# Patient Record
Sex: FEMALE | Race: BLACK OR AFRICAN AMERICAN | NOT HISPANIC OR LATINO | Employment: FULL TIME | URBAN - METROPOLITAN AREA
[De-identification: names, ages, dates, MRNs, and addresses within clinical notes are randomized per-mention and may not be internally consistent; named-entity substitution may affect disease eponyms.]

---

## 2017-02-06 ENCOUNTER — TELEPHONE (OUTPATIENT)
Dept: OBSTETRICS AND GYNECOLOGY | Facility: CLINIC | Age: 37
End: 2017-02-06

## 2017-02-06 NOTE — TELEPHONE ENCOUNTER
Scheduled appt for pt to see Ida Fair at [facility] regarding right breast pain for 2/22/2017 at 7:30 AM. Pt communicated understanding.

## 2017-02-06 NOTE — TELEPHONE ENCOUNTER
----- Message from Mariano Marques sent at 2/6/2017 10:11 AM CST -----  Contact: pt  x_ 1st Request   _ 2nd Request   _ 3rd Request     Who: JUAN PABLO OLSON [[id number]]    Why: pt is experiencing right breast pain is requesting a call back to discuss options maybe mammogram    What Number to Call Back: [phone]    When to Expect a call back: (Before the end of the day)   -- if call after 3:00 call back will be tomorrow.

## 2017-02-22 ENCOUNTER — OFFICE VISIT (OUTPATIENT)
Dept: SURGERY | Facility: CLINIC | Age: 37
End: 2017-02-22
Payer: COMMERCIAL

## 2017-02-22 VITALS
HEART RATE: 75 BPM | TEMPERATURE: 98 F | BODY MASS INDEX: 37.88 KG/M2 | HEIGHT: 61 IN | WEIGHT: 200.63 LBS | DIASTOLIC BLOOD PRESSURE: 65 MMHG | SYSTOLIC BLOOD PRESSURE: 120 MMHG

## 2017-02-22 DIAGNOSIS — N64.4 BREAST PAIN: Primary | ICD-10-CM

## 2017-02-22 PROCEDURE — 1160F RVW MEDS BY RX/DR IN RCRD: CPT | Mod: S$GLB,,, | Performed by: NURSE PRACTITIONER

## 2017-02-22 PROCEDURE — 99999 PR PBB SHADOW E&M-EST. PATIENT-LVL III: CPT | Mod: PBBFAC,,, | Performed by: NURSE PRACTITIONER

## 2017-02-22 PROCEDURE — 99202 OFFICE O/P NEW SF 15 MIN: CPT | Mod: S$GLB,,, | Performed by: NURSE PRACTITIONER

## 2017-02-22 RX ORDER — DESOGESTREL AND ETHINYL ESTRADIOL 21-5 (28)
1 KIT ORAL DAILY
COMMUNITY
End: 2018-01-10 | Stop reason: SDUPTHER

## 2017-02-22 NOTE — PROGRESS NOTES
Subjective:      Patient ID: Melany Molina is a 36 y.o. female.    Chief Complaint: Consult (New Patient Right Breast Pain)      HPI: (PF, EPF - 1-3) (Detailed, Comp, - 4) new patient referred by Dr Oakley presents with c/o right breast pain, onset 2 weeks ago, reports sudden onset of burning pain right breast which lasted one day, spontaneously resolved. Denies breast mass, nipple discharge, skin changes, changes in her diet or medications. NO recalled breast trauma    Menarche at 10  , miscarriage  Early onset menopause at 25      Review of Systems  Objective:   Physical Exam   Pulmonary/Chest: She exhibits no mass, no tenderness, no edema, no deformity, no swelling and no retraction. Right breast exhibits no inverted nipple, no mass, no nipple discharge, no skin change and no tenderness. Left breast exhibits no inverted nipple, no mass, no nipple discharge, no skin change and no tenderness. Breasts are symmetrical. There is no breast swelling.   Lymphadenopathy:     She has no cervical adenopathy.     She has no axillary adenopathy.        Right: No supraclavicular adenopathy present.        Left: No supraclavicular adenopathy present.     Assessment:       1. Breast pain        Plan:       Clinically VIRIDIANA  Pain right breast for one day which spontaneously resolved, most likely cystic in origin. Reassurance provided, no further action recommended  Return prn any return of persistent breast pain, palpable mass, nipple discharge, skin changes

## 2017-02-22 NOTE — LETTER
February 22, 2017      Mary Oakley MD  [address]  [address]  [place] [facility] [place] [address]           [facility]  [address] [address]  Phone: [phone]          Patient: Melany Molina   MR Number: [id number]   YOB: 1980   Date of Visit: 2/22/2017       Dear Dr. Mary Oakley:    Thank you for referring Melany Molina to me for evaluation. Attached you will find relevant portions of my assessment and plan of care.    If you have questions, please do not hesitate to call me. I look forward to following Melany Molina along with you.    Sincerely,    Ida Fair, YOSELIN-CONTRERASP    Enclosure  CC:  No Recipients    If you would like to receive this communication electronically, please contact [email or web address] or [phone] to request more information on [employer] Link access.    For providers and/or their staff who would like to refer a patient to [facility], please contact us through our one-stop-shop provider referral line, [facility] , at [phone].    If you feel you have received this communication in error or would no longer like to receive these types of communications, please e-mail [email or web address]

## 2017-03-03 DIAGNOSIS — Z91.09 HISTORY OF AIRBORNE ALLERGIES: Primary | ICD-10-CM

## 2017-03-03 RX ORDER — AZELASTINE 1 MG/ML
SPRAY, METERED NASAL
Qty: 30 ML | Refills: 7 | Status: SHIPPED | OUTPATIENT
Start: 2017-03-03 | End: 2018-08-13 | Stop reason: SDUPTHER

## 2017-03-03 NOTE — TELEPHONE ENCOUNTER
----- Message from Sujey Little sent at 3/3/2017  9:58 AM CST -----  Contact: pt   X_  1st Request  _  2nd Request  _  3rd Request        Who: JUAN PABLO MORRISSEY [[id number]]X    Why: pt is requesting a refill in Rx azelastine (ASTELIN) 137 mcg (0.1 %) nasal spray     What Number to Call Back: [phone].    When to Expect a call back: (Before the end of the day)   -- if the call is after 12:00, the call back will be tomorrow.

## 2017-03-03 NOTE — TELEPHONE ENCOUNTER
Informed pt that a refill for her Astelin nasal spray has been approved by Dr. Oakley and sent to her pharmacy. Pt communicated understanding.

## 2017-03-03 NOTE — TELEPHONE ENCOUNTER
Pt requests refill for Astelin 137 mcg (0.1%) nasal spray. Pt says she uses the nasal spray daily and has run out of her last refill. PT says she typically receives refill from Dr. Oakley. Told pt I would pend and route orders to  to review and sign when she returns to the office on Monday.    Refill orders pended and routed to  to review and sign.

## 2017-04-13 RX ORDER — HYDROXYZINE PAMOATE 25 MG/1
25 CAPSULE ORAL EVERY 6 HOURS PRN
Qty: 30 CAPSULE | Refills: 0 | Status: SHIPPED | OUTPATIENT
Start: 2017-04-13 | End: 2018-08-13

## 2017-04-13 NOTE — TELEPHONE ENCOUNTER
Pt requests refill of Vistaril 25 mg capsules as she will be taking a flight this afternoon and says it helps with her anxiety.  Phoned in order and pended order to OSWALDO Koch NP to sign. Notified pt that Rx will be called in.

## 2017-04-13 NOTE — TELEPHONE ENCOUNTER
----- Message from Edi Brennan sent at 4/13/2017  1:54 PM CDT -----  Contact: Pt  X_ 1st Request  _ 2nd Request  _ 3rd Request    Who:JUAN PABLO MORRISSEY [[id number]]    Why: Patient states she would like to speak with staff in regards to getting a refill on hydrOXYzine pamoate (VISTARIL) 25 MG Cap called into [facility] [id number] [facility] [place] [address]. Patient is requesting meds before leaving out tonight at 7, please call with confirmation    What Number to Call Back: [phone]    When to Expect a call back: (Before the end of the day)  -- if call after 3:00 call back will be tomorrow.

## 2018-01-10 ENCOUNTER — TELEPHONE (OUTPATIENT)
Dept: OBSTETRICS AND GYNECOLOGY | Facility: CLINIC | Age: 38
End: 2018-01-10

## 2018-01-10 RX ORDER — DESOGESTREL AND ETHINYL ESTRADIOL AND ETHINYL ESTRADIOL 21-5 (28)
KIT ORAL
Qty: 28 TABLET | Refills: 0 | Status: SHIPPED | OUTPATIENT
Start: 2018-01-10 | End: 2018-02-02 | Stop reason: SDUPTHER

## 2018-01-10 NOTE — TELEPHONE ENCOUNTER
----- Message from Alejandra Morales sent at 1/10/2018 11:09 AM CST -----  Contact: JUAN PABLO MORRISSEY [[id number]]      x  1st Request  _  2nd Request  _  3rd Request    Please refill the medication(s) listed below. Please call the patient when the prescription(s) is ready for  at this phone number            Medication #1 desog-e.estradiol/e.estradiol (KARIVA) 0.15-0.02 mgx21 /0.01 mg x 5 per tablet (Discontinued) 28 tablet 11     Medication #2      Preferred Pharmacy:

## 2018-02-05 RX ORDER — DESOGESTREL AND ETHINYL ESTRADIOL AND ETHINYL ESTRADIOL 21-5 (28)
KIT ORAL
Qty: 28 TABLET | Refills: 0 | Status: SHIPPED | OUTPATIENT
Start: 2018-02-05 | End: 2018-04-30 | Stop reason: SDUPTHER

## 2018-04-05 DIAGNOSIS — Z91.09 HISTORY OF AIRBORNE ALLERGIES: ICD-10-CM

## 2018-04-05 RX ORDER — AZELASTINE 1 MG/ML
SPRAY, METERED NASAL
Qty: 30 ML | Refills: 0 | OUTPATIENT
Start: 2018-04-05

## 2018-04-30 RX ORDER — DESOGESTREL AND ETHINYL ESTRADIOL AND ETHINYL ESTRADIOL 21-5 (28)
KIT ORAL
Qty: 28 TABLET | Refills: 0 | Status: SHIPPED | OUTPATIENT
Start: 2018-04-30 | End: 2018-08-13 | Stop reason: SDUPTHER

## 2018-08-13 ENCOUNTER — OFFICE VISIT (OUTPATIENT)
Dept: OBSTETRICS AND GYNECOLOGY | Facility: CLINIC | Age: 38
End: 2018-08-13
Attending: OBSTETRICS & GYNECOLOGY
Payer: COMMERCIAL

## 2018-08-13 VITALS
DIASTOLIC BLOOD PRESSURE: 70 MMHG | WEIGHT: 212.06 LBS | HEIGHT: 61 IN | BODY MASS INDEX: 40.04 KG/M2 | SYSTOLIC BLOOD PRESSURE: 100 MMHG

## 2018-08-13 DIAGNOSIS — Z01.419 ENCOUNTER FOR GYNECOLOGICAL EXAMINATION WITHOUT ABNORMAL FINDING: Primary | ICD-10-CM

## 2018-08-13 DIAGNOSIS — F41.9 ANXIETY: ICD-10-CM

## 2018-08-13 DIAGNOSIS — Z91.09 HISTORY OF AIRBORNE ALLERGIES: ICD-10-CM

## 2018-08-13 DIAGNOSIS — Z12.31 SCREENING MAMMOGRAM, ENCOUNTER FOR: ICD-10-CM

## 2018-08-13 DIAGNOSIS — Z12.4 PAP SMEAR FOR CERVICAL CANCER SCREENING: ICD-10-CM

## 2018-08-13 DIAGNOSIS — B37.9 MONILIA INFECTION: ICD-10-CM

## 2018-08-13 DIAGNOSIS — E89.40 PREMATURE SURGICAL MENOPAUSE: ICD-10-CM

## 2018-08-13 PROCEDURE — 99999 PR PBB SHADOW E&M-EST. PATIENT-LVL III: CPT | Mod: PBBFAC,,, | Performed by: OBSTETRICS & GYNECOLOGY

## 2018-08-13 PROCEDURE — 88175 CYTOPATH C/V AUTO FLUID REDO: CPT

## 2018-08-13 PROCEDURE — 99396 PREV VISIT EST AGE 40-64: CPT | Mod: S$GLB,,, | Performed by: OBSTETRICS & GYNECOLOGY

## 2018-08-13 RX ORDER — AZELASTINE 1 MG/ML
SPRAY, METERED NASAL
Qty: 30 ML | Refills: 7 | Status: SHIPPED | OUTPATIENT
Start: 2018-08-13

## 2018-08-13 RX ORDER — FLUCONAZOLE 150 MG/1
150 TABLET ORAL ONCE
Qty: 1 TABLET | Refills: 0 | Status: SHIPPED | OUTPATIENT
Start: 2018-08-13 | End: 2018-08-13

## 2018-08-13 RX ORDER — FLUCONAZOLE 150 MG/1
150 TABLET ORAL ONCE
Qty: 1 TABLET | Refills: 0 | Status: SHIPPED | OUTPATIENT
Start: 2018-08-13 | End: 2018-08-13 | Stop reason: SDUPTHER

## 2018-08-13 RX ORDER — DESOGESTREL AND ETHINYL ESTRADIOL 21-5 (28)
1 KIT ORAL DAILY
Qty: 28 TABLET | Refills: 12 | Status: SHIPPED | OUTPATIENT
Start: 2018-08-13 | End: 2019-10-08 | Stop reason: SDUPTHER

## 2018-08-13 RX ORDER — ALPRAZOLAM 1 MG/1
1 TABLET ORAL NIGHTLY PRN
Qty: 2 TABLET | Refills: 0 | Status: SHIPPED | OUTPATIENT
Start: 2018-08-13 | End: 2019-08-13

## 2018-08-13 NOTE — PROGRESS NOTES
Subjective:       Patient ID: Melany Molina is a 38 y.o. female.    Chief Complaint:  Well Woman and Vaginal Discharge (itching)      History of Present Illness  HPI    Melany Molina is a 38 y.o. female  here for her annual GYN exam.  She also has a thick clumpy discharge with itching after using Neutrogena rainbath body wash.  She describes her periods as stopped with bilateral ovarian cystectomies for Dermoids in ,   denies break through bleeding.   denies vaginal itching or irritation.  Denies vaginal discharge.  She is not currently sexually active. She has had a recent breakup  She uses oral contraceptives (estrogen/progesterone) for contraception.   History of abnormal pap: No  Last Pap: approximate date  and was normal  Last MMG: None  Last Colonoscopy:  None  denies domestic violence. She does feel safe at home.     Past Medical History:   Diagnosis Date    Premature menopause      Past Surgical History:   Procedure Laterality Date    foot surgeries Bilateral     OVARIAN CYST REMOVAL Bilateral     Bilateral Dermoid cysts.     Social History     Socioeconomic History    Marital status:      Spouse name: Not on file    Number of children: Not on file    Years of education: Not on file    Highest education level: Not on file   Social Needs    Financial resource strain: Not on file    Food insecurity - worry: Not on file    Food insecurity - inability: Not on file    Transportation needs - medical: Not on file    Transportation needs - non-medical: Not on file   Occupational History    Not on file   Tobacco Use    Smoking status: Never Smoker    Smokeless tobacco: Never Used   Substance and Sexual Activity    Alcohol use: Yes     Alcohol/week: 0.6 oz     Types: 1 Glasses of wine per week    Drug use: No    Sexual activity: Not Currently     Partners: Male     Birth control/protection: OCP     Comment: , Current long term relationship just ended  "  Other Topics Concern    Not on file   Social History Narrative    Not on file     Family History   Problem Relation Age of Onset    Breast cancer Maternal Aunt 50    Breast cancer Paternal Aunt 50    Hypertension Mother     Colon cancer Neg Hx     Diabetes Neg Hx     Ovarian cancer Neg Hx     Stroke Neg Hx      OB History      Para Term  AB Living    2       2      SAB TAB Ectopic Multiple Live Births    1 1                /70   Ht 5' 1" (1.549 m)   Wt 96.2 kg (212 lb 1.3 oz)   LMP 2003   BMI 40.07 kg/m²         GYN & OB History  Patient's last menstrual period was 2003.   Date of Last Pap: 2015    OB History    Para Term  AB Living   2       2     SAB TAB Ectopic Multiple Live Births   1 1            # Outcome Date GA Lbr Corbin/2nd Weight Sex Delivery Anes PTL Lv   2 TAB 02           1 SAB 01                  Review of Systems  Review of Systems   Constitutional: Negative for activity change, appetite change, fatigue and unexpected weight change.   HENT: Negative.    Eyes: Negative for visual disturbance.   Respiratory: Negative for shortness of breath and wheezing.    Cardiovascular: Negative for chest pain, palpitations and leg swelling.   Gastrointestinal: Negative for abdominal pain, bloating and blood in stool.   Endocrine: Negative for diabetes and hair loss.   Genitourinary: Positive for vaginal discharge. Negative for decreased libido and dyspareunia.   Musculoskeletal: Negative for back pain and joint swelling.   Skin:  Negative for no acne and hair changes.   Neurological: Negative for headaches.   Hematological: Does not bruise/bleed easily.   Psychiatric/Behavioral: Negative for depression and sleep disturbance. The patient is not nervous/anxious.    Breast: Negative for breast pain and nipple discharge          Objective:      Physical Exam:   Constitutional: She is oriented to person, place, and time. She appears " well-developed and well-nourished.    HENT:   Head: Normocephalic and atraumatic.    Eyes: EOM are normal. Pupils are equal, round, and reactive to light.    Neck: Normal range of motion. Neck supple.    Cardiovascular: Normal rate and regular rhythm.     Pulmonary/Chest: Effort normal and breath sounds normal.   BREASTS:  no mass, no tenderness, no deformity and no retraction. Right breast exhibits no inverted nipple, no mass, no nipple discharge, no skin change, no tenderness, no bleeding and no swelling. Left breast exhibits no inverted nipple, no mass, no nipple discharge, no skin change, no tenderness, no bleeding and no swelling. Breasts are symmetrical.              Abdominal: Soft. Bowel sounds are normal.     Genitourinary: Pelvic exam was performed with patient supine.   Genitourinary Comments: PELVIC: Normal external genitalia without lesions.  Normal hair distribution.  Adequate perineal body, normal urethral meatus.  Vagina moist and well rugated without lesions ,Cream colored Clumpy  Discharge noted.  Cervix pink, without lesions, discharge or tenderness.  No significant cystocele or rectocele.  Bimanual exam shows uterus to be NOT PALPABLE SECONDARY TO HABITUS, and nontender.  Adnexa without masses or tenderness.               Musculoskeletal: Normal range of motion and moves all extremeties.       Neurological: She is alert and oriented to person, place, and time.    Skin: Skin is warm and dry.    Psychiatric: She has a normal mood and affect.              Assessment:        1. Encounter for gynecological examination without abnormal finding    2. Pap smear for cervical cancer screening    3. Screening mammogram, encounter for    4. Premature surgical menopause    5. History of airborne allergies    6. Monilia infection    7. Anxiety               Plan:        1. Encounter for gynecological examination without abnormal finding  COUNSELING:  The patient was counseled today on osteoporosis prevention,  calcium supplementation, and regular weight bearing exercise. The patient was also counseled today on ACS PAP guidelines, with recommendations for yearly pelvic exams unless their uterus, cervix, and ovaries were removed for benign reasons; in that case, examinations every 3-5 years are recommended. The patient was also counseled regarding monthly breast self-examination, routine STD screening for at-risk populations, prophylactic immunizations for transmitted infections such as HPV, Pertussis, or Influenza as appropriate, and yearly mammograms when indicated by ACS guidelines. She was advised to see her primary care physician for all other health maintenance.   FOLLOW-UP with me for next routine visit.         2. Pap smear for cervical cancer screening    - Liquid-based pap smear, screening    3. Screening mammogram, encounter for    - Mammo Digital Screening Bilat with Tomosynthesis CAD; Future    4. Premature surgical menopause    - desog-e.estradiol/e.estradiol (VIORELE, 28,) 0.15-0.02 mgx21 /0.01 mg x 5 per tablet; Take 1 tablet by mouth once daily.  Dispense: 28 tablet; Refill: 12    5. History of airborne allergies    - azelastine (ASTELIN) 137 mcg (0.1 %) nasal spray; INHALE ONE SPRAY IN EACH NOSTRIL BID  Dispense: 30 mL; Refill: 7    6. Monilia infection    - fluconazole (DIFLUCAN) 150 MG Tab; Take 1 tablet (150 mg total) by mouth once. for 1 dose  Dispense: 1 tablet; Refill: 0    7. Anxiety    - ALPRAZolam (XANAX) 1 MG tablet; Take 1 tablet (1 mg total) by mouth nightly as needed for Insomnia. Take prior to boarding plane.  Dispense: 2 tablet; Refill: 0       Follow-up in about 1 year (around 8/13/2019).

## 2018-08-24 ENCOUNTER — HOSPITAL ENCOUNTER (OUTPATIENT)
Dept: RADIOLOGY | Facility: HOSPITAL | Age: 38
Discharge: HOME | End: 2018-08-24
Attending: OBSTETRICS & GYNECOLOGY
Payer: COMMERCIAL

## 2018-08-24 DIAGNOSIS — Z12.31 SCREENING MAMMOGRAM, ENCOUNTER FOR: ICD-10-CM

## 2018-08-24 PROCEDURE — 77063 BREAST TOMOSYNTHESIS BI: CPT | Mod: TC

## 2018-08-24 PROCEDURE — 77063 BREAST TOMOSYNTHESIS BI: CPT | Mod: 26,,, | Performed by: RADIOLOGY

## 2018-08-24 PROCEDURE — 77067 SCR MAMMO BI INCL CAD: CPT | Mod: 26,,, | Performed by: RADIOLOGY

## 2019-10-08 ENCOUNTER — TELEPHONE (OUTPATIENT)
Dept: OBSTETRICS AND GYNECOLOGY | Facility: CLINIC | Age: 39
End: 2019-10-08

## 2019-10-08 DIAGNOSIS — E89.40 PREMATURE SURGICAL MENOPAUSE: ICD-10-CM

## 2019-10-08 RX ORDER — DESOGESTREL AND ETHINYL ESTRADIOL 21-5 (28)
1 KIT ORAL DAILY
Qty: 28 TABLET | Refills: 3 | Status: SHIPPED | OUTPATIENT
Start: 2019-10-08

## 2019-10-08 NOTE — TELEPHONE ENCOUNTER
Left message to patient to call the office at [phone] regarding prescription refill. Patient is overdue for annual and has relocated to [place].

## 2019-10-08 NOTE — TELEPHONE ENCOUNTER
----- Message from Bry Bhatti sent at 10/8/2019 11:48 AM CDT -----  PT RETURNING YOUR CALL [phone]

## 2019-10-08 NOTE — TELEPHONE ENCOUNTER
----- Message from Jordy Lowery sent at 10/8/2019  9:44 AM CDT -----  Contact: JUAN PABLO MORRISSEY [[id number]]  Can the clinic reply in [facility]:     Please refill the medication(s) listed below. The patient can be reached at this phone number once it is called into the pharmacy.    Medication #1 desog-e.estradiol/e.estradiol (SHASHA, 28,) 0.15-0.02 mgx21 /0.01 mg x 5 per tablet    Patient state she has relocated to [place] request call back  [phone]    Preferred Pharmacy: [facility] in [place] [phone]

## 2019-10-08 NOTE — TELEPHONE ENCOUNTER
Patient states she recently moved to [place] and is looking for a new gyn there. Patient is requesting a refill on birth control pills.

## 2020-11-13 ENCOUNTER — OFFICE VISIT (OUTPATIENT)
Dept: URGENT CARE | Age: 40
End: 2020-11-13

## 2020-11-13 VITALS
RESPIRATION RATE: 18 BRPM | OXYGEN SATURATION: 97 % | TEMPERATURE: 99.3 F | WEIGHT: 205.03 LBS | SYSTOLIC BLOOD PRESSURE: 118 MMHG | DIASTOLIC BLOOD PRESSURE: 75 MMHG | HEART RATE: 95 BPM

## 2020-11-13 DIAGNOSIS — R05.9 COUGHING: ICD-10-CM

## 2020-11-13 DIAGNOSIS — J30.9 ALLERGIC RHINITIS, UNSPECIFIED SEASONALITY, UNSPECIFIED TRIGGER: ICD-10-CM

## 2020-11-13 DIAGNOSIS — J98.01 BRONCHOSPASM: Primary | ICD-10-CM

## 2020-11-13 DIAGNOSIS — Z82.5 FAMILY HISTORY OF ASTHMA: ICD-10-CM

## 2020-11-13 PROCEDURE — 99204 OFFICE O/P NEW MOD 45 MIN: CPT | Performed by: PEDIATRICS

## 2020-11-13 RX ORDER — ALBUTEROL SULFATE 90 UG/1
2 AEROSOL, METERED RESPIRATORY (INHALATION) EVERY 4 HOURS PRN
Qty: 1 INHALER | Refills: 0 | Status: SHIPPED | OUTPATIENT
Start: 2020-11-13

## 2020-11-13 RX ORDER — CETIRIZINE HYDROCHLORIDE 10 MG/1
10 TABLET ORAL DAILY
Qty: 30 TABLET | Refills: 0 | Status: SHIPPED | OUTPATIENT
Start: 2020-11-13

## 2020-11-13 RX ORDER — PREDNISONE 10 MG/1
TABLET ORAL
Qty: 10 TABLET | Refills: 0 | Status: SHIPPED | OUTPATIENT
Start: 2020-11-13

## 2020-11-13 ASSESSMENT — ENCOUNTER SYMPTOMS
CHOKING: 0
RHINORRHEA: 1
PSYCHIATRIC NEGATIVE: 1
SHORTNESS OF BREATH: 0
NEUROLOGICAL NEGATIVE: 1
STRIDOR: 0
APNEA: 0
WHEEZING: 0
GASTROINTESTINAL NEGATIVE: 1
ALLERGIC/IMMUNOLOGIC NEGATIVE: 1
ENDOCRINE NEGATIVE: 1
CHEST TIGHTNESS: 0
SORE THROAT: 0
CONSTITUTIONAL NEGATIVE: 1
COUGH: 1
HEMATOLOGIC/LYMPHATIC NEGATIVE: 1
EYES NEGATIVE: 1

## 2021-02-03 ENCOUNTER — HOSPITAL ENCOUNTER (OUTPATIENT)
Dept: MAMMOGRAPHY | Age: 41
Discharge: HOME | End: 2021-02-03
Attending: OBSTETRICS & GYNECOLOGY

## 2021-02-03 DIAGNOSIS — Z12.39 ENCOUNTER FOR OTHER SCREENING FOR MALIGNANT NEOPLASM OF BREAST: ICD-10-CM

## 2021-02-03 PROCEDURE — 77063 BREAST TOMOSYNTHESIS BI: CPT

## 2021-09-15 ENCOUNTER — OFFICE VISIT (OUTPATIENT)
Dept: OTOLARYNGOLOGY | Age: 41
End: 2021-09-15

## 2021-09-15 VITALS
SYSTOLIC BLOOD PRESSURE: 117 MMHG | WEIGHT: 217.81 LBS | OXYGEN SATURATION: 100 % | HEART RATE: 70 BPM | RESPIRATION RATE: 18 BRPM | DIASTOLIC BLOOD PRESSURE: 71 MMHG

## 2021-09-15 DIAGNOSIS — H90.0 CONDUCTIVE HEARING LOSS, BILATERAL: ICD-10-CM

## 2021-09-15 DIAGNOSIS — H61.23 BILATERAL IMPACTED CERUMEN: Primary | ICD-10-CM

## 2021-09-15 PROCEDURE — 69210 REMOVE IMPACTED EAR WAX UNI: CPT | Performed by: OTOLARYNGOLOGY

## 2021-09-15 PROCEDURE — 99203 OFFICE O/P NEW LOW 30 MIN: CPT | Performed by: OTOLARYNGOLOGY

## 2021-09-15 ASSESSMENT — PAIN SCALES - GENERAL: PAINLEVEL: 0

## 2022-04-14 ENCOUNTER — HOSPITAL ENCOUNTER (OUTPATIENT)
Dept: MAMMOGRAPHY | Age: 42
Discharge: HOME | End: 2022-04-14

## 2022-04-14 DIAGNOSIS — Z12.39 SCREENING FOR BREAST CANCER: ICD-10-CM

## 2022-04-14 PROCEDURE — 77063 BREAST TOMOSYNTHESIS BI: CPT
